# Patient Record
Sex: MALE | Race: WHITE | NOT HISPANIC OR LATINO | URBAN - METROPOLITAN AREA
[De-identification: names, ages, dates, MRNs, and addresses within clinical notes are randomized per-mention and may not be internally consistent; named-entity substitution may affect disease eponyms.]

---

## 2024-01-29 ENCOUNTER — OFFICE VISIT (OUTPATIENT)
Dept: CARDIOLOGY | Facility: CLINIC | Age: 44
End: 2024-01-29
Payer: COMMERCIAL

## 2024-01-29 VITALS
HEART RATE: 64 BPM | WEIGHT: 239.6 LBS | BODY MASS INDEX: 28.29 KG/M2 | SYSTOLIC BLOOD PRESSURE: 122 MMHG | OXYGEN SATURATION: 99 % | HEIGHT: 77 IN | DIASTOLIC BLOOD PRESSURE: 86 MMHG

## 2024-01-29 DIAGNOSIS — R00.2 PALPITATIONS: Primary | ICD-10-CM

## 2024-01-29 DIAGNOSIS — R94.31 ABNORMAL EKG: ICD-10-CM

## 2024-01-29 DIAGNOSIS — R06.09 DYSPNEA ON EXERTION: ICD-10-CM

## 2024-01-29 DIAGNOSIS — R07.2 PRECORDIAL PAIN: ICD-10-CM

## 2024-01-29 PROCEDURE — 99204 OFFICE O/P NEW MOD 45 MIN: CPT | Performed by: STUDENT IN AN ORGANIZED HEALTH CARE EDUCATION/TRAINING PROGRAM

## 2024-01-29 PROCEDURE — 93000 ELECTROCARDIOGRAM COMPLETE: CPT | Performed by: STUDENT IN AN ORGANIZED HEALTH CARE EDUCATION/TRAINING PROGRAM

## 2024-01-29 NOTE — PROGRESS NOTES
"      [facility]    Subjective:     Encounter Date:01/29/24      Patient ID: Earnest Mattson is a 43 y.o. male.    Chief Complaint:   Chief Complaint   Patient presents with    Establish Care     Patient is in the office today to establish care for Palpitations.       History of Present Illness    Mr. Horn is a pleasant 43-year-old gentleman past medical history ADHD, who presents for further evaluation of palpitations, fatigue, dyspnea, and chest pain.  He reports that ever since December, he is \"just felt terrible.\"  He states that he has intermittent pains and pressure in his chest and fluttering, sometimes accompanied the symptoms are left neck pain as well as left arm numbness.  They will come and go.  The actually improved with exercise, but he has noticed a decrease in his exercise tolerance since then.  He reports he is a runner and likes to exercise.  Has been evaluated by a cardiologist at [place] the past for asymptomatic bradycardia, likely due to his physical endurance.  He is never had any other cardiac testing outside of a monitor.  In fact, due to these chest pain episodes, he was evaluated the [place] [facility] on January 9.  He was ruled out for ACS and referred here for further evaluation.  Currently, his main complaint is ongoing fatigue.  Reports he has palpitations that occur most frequently at night, prominent heartbeat sensation that occur sporadically.  It happened 2 days ago most recently.  A few times a week is how long it occurs.    The following portions of the patient's history were reviewed and updated as appropriate: allergies, current medications, past family history, past medical history, past social history, past surgical history and problem list.    Past Medical History:   Diagnosis Date    Bradycardia     Palpitation        History reviewed. No pertinent surgical history.        ECG 12 Lead    Date/Time: 1/29/2024 8:57 AM  Performed by: Say Costa MD    Authorized by: " "Say Costa MD  Comparison: compared with previous ECG from 1/9/2024  Similar to previous ECG  Rhythm: sinus rhythm  Rate: normal  Conduction: conduction normal  Q waves: III, V5, V6 and V4    ST Segments: ST segments normal  T Waves: T waves normal  QRS axis: normal  Other findings: early repolarization    Clinical impression: normal ECG             Objective:     Vitals:    01/29/24 0845   BP: 122/86   Pulse: 64   SpO2: 99%   Weight: 109 kg (239 lb 9.6 oz)   Height: 195.6 cm (77\")         Constitutional:       Appearance: Healthy appearance. Not in distress.   Neck:      Vascular: JVD normal.   Pulmonary:      Effort: Pulmonary effort is normal.      Breath sounds: Normal breath sounds.   Cardiovascular:      PMI at left midclavicular line. Normal rate. Regular rhythm. Normal S2.       Murmurs: There is no murmur.   Pulses:     Intact distal pulses.   Edema:     Peripheral edema absent.   Skin:     General: Skin is warm and dry.   Neurological:      General: No focal deficit present.      Mental Status: Alert, oriented to person, place, and time and oriented to person, place and time.   Psychiatric:         Mood and Affect: Mood and affect normal.         Lab Review:       No results found for: \"BUN\", \"CREATININE\", \"K\", \"ALT\", \"AST\"  Meet his labs from [place] which revealed normal BNP,Normal troponin, CBC and CMP largely unremarkable aside from a elevated creatinine of 1.3    Performed        Assessment:          Diagnosis Plan   1. Palpitations  ECG 12 Lead    Holter Monitor - 72 Hour Up To 15 Days      2. Dyspnea on exertion  Adult Stress Echo W/ Cont or Stress Agent if Necessary Per Protocol      3. Precordial pain  Adult Stress Echo W/ Cont or Stress Agent if Necessary Per Protocol    Holter Monitor - 72 Hour Up To 15 Days      4. Abnormal EKG  Adult Stress Echo W/ Cont or Stress Agent if Necessary Per Protocol             Plan:         Elevated creatinine: Per PCP.  Checking echo per below.  Palpitations: " Arrange for 7-day Zio patch unclear etiology at this time.  Chest pain, fatigue, dyspnea  Nonspecific abnormal ECG: With his chest pain episodes, he wants further investigation.  He reports he is an avid runner, and has had some decrease in his functional capacity recently.  A lot of his pain and pressure episodes happen at rest, but given his chest pain episodes he warrants further investigation.  Will arrange for stress echo.  He does have ST segment flattening/depression noted in lead III, and early repolarization lateral precordial leads that may render traditional GXT equivocal.  Arrange for stress echo.    Thank you for allowing me to participate in the care of Earnest Daily. Feel free to contact me directly with any further questions or concerns.    RTC as needed after stress echo and Holter.  It is very possible patient is noncardiac etiology of his symptoms but we need to arrange for the above testing to ensure there is no other sinister cause of his symptomatology.    Say Costa MD  [facility]  01/29/24  08:56 EST       Current Outpatient Medications:     azithromycin (ZITHROMAX) 250 MG tablet, , Disp: , Rfl:     benzonatate (TESSALON) 100 MG capsule, , Disp: , Rfl:     levoFLOXacin (LEVAQUIN) 500 MG tablet, Take 1 tablet by mouth daily. (Patient not taking: Reported on 1/29/2024), Disp: 7 tablet, Rfl: 0         No follow-ups on file.      Part of this note may be an electronic transcription/translation of spoken language to printed text using the Dragon Dictation System.

## 2024-02-09 ENCOUNTER — TELEPHONE (OUTPATIENT)
Dept: CARDIOLOGY | Facility: CLINIC | Age: 44
End: 2024-02-09
Payer: COMMERCIAL

## 2024-02-12 ENCOUNTER — TELEPHONE (OUTPATIENT)
Dept: CARDIOLOGY | Facility: CLINIC | Age: 44
End: 2024-02-12
Payer: COMMERCIAL

## 2024-02-13 ENCOUNTER — TELEPHONE (OUTPATIENT)
Dept: CARDIOLOGY | Facility: CLINIC | Age: 44
End: 2024-02-13
Payer: COMMERCIAL